# Patient Record
Sex: MALE | Race: WHITE | NOT HISPANIC OR LATINO | Employment: FULL TIME | URBAN - METROPOLITAN AREA
[De-identification: names, ages, dates, MRNs, and addresses within clinical notes are randomized per-mention and may not be internally consistent; named-entity substitution may affect disease eponyms.]

---

## 2022-01-29 ENCOUNTER — HOSPITAL ENCOUNTER (EMERGENCY)
Facility: CLINIC | Age: 32
Discharge: HOME | End: 2022-01-29
Attending: EMERGENCY MEDICINE | Admitting: EMERGENCY MEDICINE
Payer: COMMERCIAL

## 2022-01-29 VITALS
HEART RATE: 100 BPM | WEIGHT: 210 LBS | DIASTOLIC BLOOD PRESSURE: 77 MMHG | SYSTOLIC BLOOD PRESSURE: 136 MMHG | RESPIRATION RATE: 14 BRPM | HEIGHT: 73 IN | TEMPERATURE: 98 F | BODY MASS INDEX: 27.83 KG/M2 | OXYGEN SATURATION: 98 %

## 2022-01-29 DIAGNOSIS — S09.90XA TRAUMATIC INJURY OF HEAD, INITIAL ENCOUNTER: ICD-10-CM

## 2022-01-29 PROCEDURE — 99282 EMERGENCY DEPT VISIT SF MDM: CPT

## 2022-01-29 ASSESSMENT — MIFFLIN-ST. JEOR: SCORE: 1971.43

## 2022-01-29 ASSESSMENT — ENCOUNTER SYMPTOMS
NAUSEA: 1
HEADACHES: 0
CONFUSION: 1
DIZZINESS: 1

## 2022-01-29 NOTE — LETTER
January 29, 2022      To Whom It May Concern:      William Hoff was seen in our Emergency Department today, 01/29/22. He may return to work when improved.    Sincerely,        Shannan Mejia MD

## 2022-01-30 NOTE — ED TRIAGE NOTES
"Patient states playing hockey tonight.  Would like to be evaluated for a concussion.  States \"someone drilled into my head pretty good\".  Not sure how he was hit.  Laid on ground for a couple of minutes.  Feels dizzy & confused.  Currently alert & oriented x4.   "

## 2022-01-30 NOTE — ED PROVIDER NOTES
"  History   Chief Complaint:  Head Injury       HPI   William Hoff is a 31 year old male with history of cervicalgia who presents with a head injury. The patient reports that he was playing hockey this evening and was hit in the head from behind by someone else on the ice. He states that he laid on the ice for a few minutes and was able to walk away, but felt dizzy, confused, and nauseous. He denies loss of consciousness or memory loss. He denies headache and says that his nausea is now resolved. He notes that he has a history of concussions and chronic neck pain. He denies vision problems.    Review of Systems   Eyes: Negative for visual disturbance.   Gastrointestinal: Positive for nausea.   Neurological: Positive for dizziness. Negative for headaches.   Psychiatric/Behavioral: Positive for confusion.   All other systems reviewed and are negative.    Allergies:  Sertraline  Sulfa  Ragweed  Pollen extracts  Birch    Medications:  Xanax  Flexeril    Past Medical History:     ADD  Anxiety  Cervicalgia  OCD  POTS    Concussion    Social History:  Patient presents alone  Travels to [place] weekly for work  Plays hockey    Physical Exam     Patient Vitals for the past 24 hrs:   BP Temp Temp src Pulse Resp SpO2 Height Weight   01/29/22 2245 136/77 98  F (36.7  C) Temporal 100 14 98 % 1.854 m (6' 1\") 95.3 kg (210 lb)       Physical Exam  General: Sitting up in bed  Eyes:  The pupils are equal and round    Conjunctivae and sclerae are normal    Pupils reactive to light bilaterally    EOMI  ENT:    Wearing a mask. No head trauma  Neck:  No midline neck tenderness. Reports mild tenderness on bilateral cervical spine muscles -chronic for him  CV:  Regular rate, regular rhythm     Skin warm and well perfused   Resp:  Non labored breathing on room air    No tachypnea    No cough heard    Lungs clear bilaterally  MS:  Normal muscular tone  Skin:  No rash or acute skin lesions noted  Neuro:   Awake, alert.      GCS " 15    Speech is normal and fluent.    Face is symmetric.     Moves all extremities equally    SILT on bilateral UE/LE    No arm or leg weakness  Psych: Normal affect.  Appropriate interactions.    Emergency Department Course       Emergency Department Course:       Reviewed:  I reviewed nursing notes, vitals, past medical history and Care Everywhere    Assessments:  2312 I obtained history and examined the patient as noted above.     Disposition:  The patient was discharged to home.     Impression & Plan     Medical Decision Making:  William Hoff is a 31 year old male who presents for evaluation following a head injury.  The injury was mechanical in nature.  The patient is not on any blood thinners.  The patient has no neurologic deficit.  The patient had no episodes of vomiting. Imaging was discussed and not obtained, based on risk stratification using [place] head CT rules, patient comfort, and symptoms.  Discussed that certainly some of his symptoms currently can be from concussion. The patient  was counseled regarding post-concussive syndrome including cognitive, behavioral and emotional aspects. They were also instructed to refrain from any contact sports until symptom free or until cleared by a primary care provider. No indication for neck imaging based on decision rules. The patient is comfortable with discharge home.    Diagnosis:    ICD-10-CM    1. Traumatic injury of head, initial encounter  S09.90XA        Scribe Disclosure:  I, Marilyn Susie, am serving as a scribe at 10:59 PM on 1/29/2022 to document services personally performed by Shannan Mejia MD based on my observations and the provider's statements to me.          Shannan Mejia MD  01/30/22 0005

## 2023-12-28 ENCOUNTER — TRANSFERRED RECORDS (OUTPATIENT)
Dept: HEALTH INFORMATION MANAGEMENT | Facility: CLINIC | Age: 33
End: 2023-12-28

## 2024-05-03 ENCOUNTER — TRANSFERRED RECORDS (OUTPATIENT)
Dept: HEALTH INFORMATION MANAGEMENT | Facility: CLINIC | Age: 34
End: 2024-05-03
Payer: COMMERCIAL

## 2024-05-03 ENCOUNTER — MEDICAL CORRESPONDENCE (OUTPATIENT)
Dept: HEALTH INFORMATION MANAGEMENT | Facility: CLINIC | Age: 34
End: 2024-05-03
Payer: COMMERCIAL

## 2024-05-07 ENCOUNTER — TRANSCRIBE ORDERS (OUTPATIENT)
Dept: OTHER | Age: 34
End: 2024-05-07

## 2024-05-07 DIAGNOSIS — H53.10 UNSPECIFIED SUBJECTIVE VISUAL DISTURBANCES: Primary | ICD-10-CM

## 2024-05-07 DIAGNOSIS — H43.393 OTHER VITREOUS OPACITIES, BILATERAL: ICD-10-CM

## 2024-05-08 NOTE — TELEPHONE ENCOUNTER
FUTURE VISIT INFORMATION      FUTURE VISIT INFORMATION:  Date: 5/22/24  Time: 7:30am  Location: [facility]  REFERRAL INFORMATION:  Referring provider:  Derian Hurd MD   Referring providers clinic:  [facility] OPHTHALMOLOGY   Reason for visit/diagnosis  Unspecified subjective visual disturbances [H53.10] Other vitreous opacities, bilateral [H43.393]     RECORDS REQUESTED FROM:       Clinic name Comments Records Status Imaging Status   \[facility]\""  Recs scanned into chart under 5/3/24 epic

## 2024-05-16 ENCOUNTER — TELEPHONE (OUTPATIENT)
Dept: OPHTHALMOLOGY | Facility: CLINIC | Age: 34
End: 2024-05-16
Payer: COMMERCIAL

## 2024-05-18 NOTE — PROGRESS NOTES
William Hoff is a 33 year old male with the following diagnoses:   1. Visual snow syndrome    2. Unspecified subjective visual disturbances    3. Other vitreous opacities, bilateral         Patient was sent for consultation by Dr. Hurd for visual disturbances.     HPI:      5/3/24 seen by Dr. Hurd At that time he noticed an incrase in longstanting flashes and floaders He reports that he has had 2 concussions within the last 6 months. One from a hockey injury and the other from his kid head butting him between the eyes. Patient is coming in today due to seeing silvery/clear spots in his vision that move since 19 years old. They have become more pronounced since his recent concussion. He notices it more when looking at the bright padmini or in bright lighting, white surface and while looking at the computer. He doesn't see them in dim lighting. The spots are not obstructive of his vision just distracting. He does have static in his vision at night and when he closes his eyes but the other dots are not similar to the spots he sees in his vision during the day or on bright solid colored surfaces. He endorses headaches. He's had headaches his whole life but they are worse recently since he had a concussion a few weeks ago. He had a MRI brain done at [facility] in 2023.    Independent historians:  Patient    Review of outside testin2023 MRI Brain WWO:  Report: no acute processes      My interpretation performed today of outside testing:  There are no enhancing or T2 hyperintensity along the visual pathway.         Review of outside clinical notes:    5/3/24  -- Visit with Dr. Hurd      Past medical history:    Patient Active Problem List   Diagnosis    POTS (postural orthostatic tachycardia syndrome)    Concussion   HLD       Medications:   Melatonin   Zyrtec   Xanax PRN       Family history / social history:  Patient's family history includes Glaucoma in his maternal grandmother.     Patient  reports  that he has never smoked. He has never used smokeless tobacco.       Exam:  VA 20/20 OD, 20/20 OS. Color vision full each eye. Pupils no rAPD. Anterior segment wnl for age. Posterior segment wnl for age. Strabismus N/A.       Tests ordered and interpreted today:  VF LVC: full OD, full OS         Discussion of management / interpretation with another provider:   None    Assessment/Plan:   It is my impression that patient has persistent positive visual phenomena of migraine also known as visual snow.  This is an entity where patients see constant or nearly constant unformed hallucinations.  The most common description that patients give is television snow, however it has been described as red dots, pixelations, and black spots.  We discussed benign blue entopic visual phenomenon.    Given the normal visual field, no further workup is necessary.  I reassured the patient that this is a common phenomenon and is not vision threatening.  I also told the patient that learning to ignore it is the best strategy and that there is no treatment for it.   This was first described in an article published in Neurology. 1995 Apr;45(4):664-8. Persistent positive visual phenomena in migraine. Cipriano GT1, Sydnie NJ, Bear SL, Ananth NJ, Frances F, Dionne GS. Some patients have noticed improvement temporarily from their visual  Snow by watching a video of static.  An example of this can be found here: [email or web address]/watch?[email or web address]=[email or web address]    Reassurance was given.I am always happy to see William back for new concerns but I did not make a follow up appointment for him today.           Attending Physician Attestation:  Complete documentation of historical and exam elements from today's encounter can be found in the full encounter summary report (not reduplicated in this progress note).  I personally obtained the chief complaint(s) and history of present illness.  I confirmed and edited as necessary the review of systems, past  medical/surgical history, family history, social history, and examination findings as documented by others; and I examined the patient myself.  I personally reviewed the relevant tests, images, and reports as documented above.  I formulated and edited as necessary the assessment and plan and discussed the findings and management plan with the patient and family. I personally reviewed the ophthalmic test(s) associated with this encounter, agree with the interpretation(s) as documented by the resident/fellow, and have edited the corresponding report(s) as necessary.  - Beck Hooker MD   Fellow, Neuro-Ophthalmology       Precharting:  Nohemy Mejía MS3

## 2024-05-22 ENCOUNTER — OFFICE VISIT (OUTPATIENT)
Dept: OPHTHALMOLOGY | Facility: CLINIC | Age: 34
End: 2024-05-22
Attending: OPHTHALMOLOGY
Payer: COMMERCIAL

## 2024-05-22 ENCOUNTER — PRE VISIT (OUTPATIENT)
Dept: OPHTHALMOLOGY | Facility: CLINIC | Age: 34
End: 2024-05-22

## 2024-05-22 DIAGNOSIS — H53.19 VISUAL SNOW SYNDROME: Primary | ICD-10-CM

## 2024-05-22 DIAGNOSIS — H53.10 UNSPECIFIED SUBJECTIVE VISUAL DISTURBANCES: ICD-10-CM

## 2024-05-22 DIAGNOSIS — H53.40 VISUAL FIELD DEFECT: Primary | ICD-10-CM

## 2024-05-22 DIAGNOSIS — H43.393 OTHER VITREOUS OPACITIES, BILATERAL: ICD-10-CM

## 2024-05-22 PROBLEM — S06.0XAA CONCUSSION: Status: ACTIVE | Noted: 2024-05-22

## 2024-05-22 PROBLEM — G90.A POTS (POSTURAL ORTHOSTATIC TACHYCARDIA SYNDROME): Status: ACTIVE | Noted: 2017-01-11

## 2024-05-22 PROCEDURE — 99243 OFF/OP CNSLTJ NEW/EST LOW 30: CPT | Mod: GC | Performed by: OPHTHALMOLOGY

## 2024-05-22 PROCEDURE — 92083 EXTENDED VISUAL FIELD XM: CPT | Mod: GC | Performed by: OPHTHALMOLOGY

## 2024-05-22 RX ORDER — CETIRIZINE HYDROCHLORIDE 10 MG/1
TABLET ORAL
COMMUNITY

## 2024-05-22 ASSESSMENT — TONOMETRY
OS_IOP_MMHG: 09
OD_IOP_MMHG: 09
IOP_METHOD: ICARE

## 2024-05-22 ASSESSMENT — VISUAL ACUITY
METHOD: SNELLEN - LINEAR
OD_SC: 20/20
OS_SC+: -1
OD_SC+: -1
OS_SC: 20/20

## 2024-05-22 ASSESSMENT — SLIT LAMP EXAM - LIDS
COMMENTS: NORMAL
COMMENTS: NORMAL

## 2024-05-22 ASSESSMENT — CUP TO DISC RATIO
OD_RATIO: 0.0
OS_RATIO: 0.0

## 2024-05-22 ASSESSMENT — CONF VISUAL FIELD
OD_NORMAL: 1
OS_NORMAL: 1
OS_SUPERIOR_TEMPORAL_RESTRICTION: 0
OD_SUPERIOR_TEMPORAL_RESTRICTION: 0
OD_INFERIOR_NASAL_RESTRICTION: 0
OS_INFERIOR_TEMPORAL_RESTRICTION: 0
OS_INFERIOR_NASAL_RESTRICTION: 0
OS_SUPERIOR_NASAL_RESTRICTION: 0
METHOD: COUNTING FINGERS
OD_INFERIOR_TEMPORAL_RESTRICTION: 0
OD_SUPERIOR_NASAL_RESTRICTION: 0

## 2024-05-22 ASSESSMENT — EXTERNAL EXAM - LEFT EYE: OS_EXAM: NORMAL

## 2024-05-22 ASSESSMENT — EXTERNAL EXAM - RIGHT EYE: OD_EXAM: NORMAL

## 2024-05-22 NOTE — LETTER
May 22, 2024     RE:  :  MRN: William Hoff  1990  [id number]     Dear Dr. Hurd    Thank you for asking me to see your very pleasant patient, William Hoff, in neuro-ophthalmic consultation.  I would like to thank you for sending your records and I have summarized them in the history of present illness.   My assessment and plan are below.  For further details, please see my attached clinic note.      William Hoff is a 33 year old male with the following diagnoses:   1. Visual snow syndrome    2. Unspecified subjective visual disturbances    3. Other vitreous opacities, bilateral         Patient was sent for consultation by Dr. Hurd for visual disturbances.     HPI:      5/3/24 seen by Dr. Hurd At that time he noticed an incrase in longstanting flashes and floaders He reports that he has had 2 concussions within the last 6 months. One from a hockey injury and the other from his kid head butting him between the eyes. Patient is coming in today due to seeing silvery/clear spots in his vision that move since 19 years old. They have become more pronounced since his recent concussion. He notices it more when looking at the bright padmini or in bright lighting, white surface and while looking at the computer. He doesn't see them in dim lighting. The spots are not obstructive of his vision just distracting. He does have static in his vision at night and when he closes his eyes but the other dots are not similar to the spots he sees in his vision during the day or on bright solid colored surfaces. He endorses headaches. He's had headaches his whole life but they are worse recently since he had a concussion a few weeks ago. He had a MRI brain done at [facility] in 2023.    Independent historians:  Patient    Review of outside testin2023 MRI Brain WWO:  Report: no acute processes      My interpretation performed today of outside testing:  There are no enhancing or T2 hyperintensity along  the visual pathway.         Review of outside clinical notes:    5/3/24  -- Visit with Dr. Hurd      Past medical history:    Patient Active Problem List   Diagnosis    POTS (postural orthostatic tachycardia syndrome)    Concussion   HLD       Medications:   Melatonin   Zyrtec   Xanax PRN       Family history / social history:  Patient's family history includes Glaucoma in his maternal grandmother.     Patient  reports that he has never smoked. He has never used smokeless tobacco.       Exam:  VA 20/20 OD, 20/20 OS. Color vision full each eye. Pupils no rAPD. Anterior segment wnl for age. Posterior segment wnl for age. Strabismus N/A.       Tests ordered and interpreted today:  VF LVC: full OD, full OS         Discussion of management / interpretation with another provider:   None    Assessment/Plan:   It is my impression that patient has persistent positive visual phenomena of migraine also known as visual snow.  This is an entity where patients see constant or nearly constant unformed hallucinations.  The most common description that patients give is television snow, however it has been described as red dots, pixelations, and black spots.  We discussed benign blue entopic visual phenomenon.    Given the normal visual field, no further workup is necessary.  I reassured the patient that this is a common phenomenon and is not vision threatening.  I also told the patient that learning to ignore it is the best strategy and that there is no treatment for it.   This was first described in an article published in Neurology. 1995 Apr;45(4):664-8. Persistent positive visual phenomena in migraine. Cipriano GT1, Sydnie NJ, Bear SL, Ananth YA, Frances F, Dionne GS. Some patients have noticed improvement temporarily from their visual  Snow by watching a video of static.  An example of this can be found here: [email or web address]?m=[email or web address]    Reassurance was given.I am always happy to see William back for new  concerns but I did not make a follow up appointment for him today.           Again, thank you for allowing me to participate in the care of your patient.      Sincerely,    Beck Esparza MD  Professor  Director of Neuro-Ophthalmology  [place]  [facility] Hospitaled Chair  Departments of Ophthalmology, Neurology, and Neurosurgery  [facility] [address]  [address] [place] [address]  [id number]  T - [phone]  F - [phone]  LENA [email or web address]      CC: Derian Hurd MD  [facility] Ophthalmology  [address]  [facility] [address]  Via Fax: [phone]    DX = visual snow

## 2024-05-22 NOTE — NURSING NOTE
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  In the past 6 months had two additional concussions and a severe case of COVID.  All of his past symptoms have returned-the most bothersome being static in vision and he sees thousands of fluttering dots when he looks at the padmini or a white wall.    FRANCISCO JAVIER Granados 7:31 AM  May 22, 2024

## 2024-06-23 ENCOUNTER — HEALTH MAINTENANCE LETTER (OUTPATIENT)
Age: 34
End: 2024-06-23

## 2025-07-12 ENCOUNTER — HEALTH MAINTENANCE LETTER (OUTPATIENT)
Age: 35
End: 2025-07-12